# Patient Record
Sex: FEMALE | Race: BLACK OR AFRICAN AMERICAN
[De-identification: names, ages, dates, MRNs, and addresses within clinical notes are randomized per-mention and may not be internally consistent; named-entity substitution may affect disease eponyms.]

---

## 2022-01-26 ENCOUNTER — HOSPITAL ENCOUNTER (EMERGENCY)
Dept: HOSPITAL 61 - ER | Age: 37
LOS: 1 days | Discharge: HOME | End: 2022-01-27
Payer: MEDICAID

## 2022-01-26 VITALS — WEIGHT: 135.14 LBS | HEIGHT: 63 IN | BODY MASS INDEX: 23.95 KG/M2

## 2022-01-26 VITALS — DIASTOLIC BLOOD PRESSURE: 85 MMHG | SYSTOLIC BLOOD PRESSURE: 140 MMHG

## 2022-01-26 DIAGNOSIS — R10.13: Primary | ICD-10-CM

## 2022-01-26 DIAGNOSIS — F14.90: ICD-10-CM

## 2022-01-26 DIAGNOSIS — R11.2: ICD-10-CM

## 2022-01-26 PROCEDURE — 80307 DRUG TEST PRSMV CHEM ANLYZR: CPT

## 2022-01-26 PROCEDURE — 99284 EMERGENCY DEPT VISIT MOD MDM: CPT

## 2022-01-26 PROCEDURE — 81001 URINALYSIS AUTO W/SCOPE: CPT

## 2022-01-26 PROCEDURE — 87086 URINE CULTURE/COLONY COUNT: CPT

## 2022-01-26 PROCEDURE — 93005 ELECTROCARDIOGRAM TRACING: CPT

## 2022-01-26 PROCEDURE — 81025 URINE PREGNANCY TEST: CPT

## 2022-01-27 LAB
AMPHETAMINE/METHAMPHETAMINE: (no result)
APTT PPP: (no result) S
BACTERIA #/AREA URNS HPF: (no result) /HPF
BARBITURATES UR-MCNC: (no result) UG/ML
BENZODIAZ UR-MCNC: (no result) UG/L
BILIRUB UR QL STRIP: (no result)
CANNABINOIDS UR-MCNC: (no result) UG/L
COCAINE UR-MCNC: (no result) NG/ML
FIBRINOGEN PPP-MCNC: CLEAR MG/DL
METHADONE SERPL-MCNC: (no result) NG/ML
NITRITE UR QL STRIP: NEGATIVE
OPIATES UR-MCNC: (no result) NG/ML
PCP SERPL-MCNC: (no result) MG/DL
PH UR STRIP: 6 [PH]
PROT UR STRIP-MCNC: 30 MG/DL
RBC #/AREA URNS HPF: (no result) /HPF (ref 0–2)
UROBILINOGEN UR-MCNC: 1 MG/DL
WBC #/AREA URNS HPF: (no result) /HPF (ref 0–4)

## 2022-01-27 RX ADMIN — FAMOTIDINE ONE MG: 20 TABLET ORAL at 00:09

## 2022-01-27 RX ADMIN — ONDANSETRON ONE MG: 2 INJECTION INTRAMUSCULAR; INTRAVENOUS at 00:01

## 2022-01-27 RX ADMIN — FAMOTIDINE ONE MG: 10 INJECTION, SOLUTION INTRAVENOUS at 00:00

## 2022-01-27 RX ADMIN — ONDANSETRON ONE MG: 4 TABLET, ORALLY DISINTEGRATING ORAL at 00:09

## 2022-01-27 RX ADMIN — Medication ONE ML: at 00:09

## 2022-01-27 NOTE — PHYS DOC
Past Medical History


Past Surgical History:  Other


Additional Past Surgical Histo:  hernia repair


Smoking Status:  Never Smoker


Alcohol Use:  Occasionally





General Adult


EDM:


Chief Complaint:  ABDOMINAL PAIN





HPI:


HPI:





Patient is a 36  year old female who presents to the ED today complaining of 10 

out of 10 epigastric abdominal pain, symptoms have been going on intermittently 

for months.  Patient is also complaining of nausea and vomiting.  She reports 

vomiting twice in the last 24 hours.  Patient denies anything specifically 

exacerbating or relieving her pain.  She is very decisive in her HPI.  She 

states she is she has been evaluated before for this pain.  She states she was 

seen at [facility] a month ago had a full work-up and was given GI 

cocktail, Pepcid and Zofran.  She states she was told to follow-up with GI for 

an EGD.  She states she has an appointment with her PCP on February 4, 2022.  

She presents today requesting something for her pain.  She states there is 

nothing new about her pain.





Review of Systems:


Review of Systems:


Constitutional:   Denies fever or chills. []


Eyes:   Denies change in visual acuity. []


HENT:   Denies nasal congestion or sore throat. [] 


Respiratory:   Denies cough or shortness of breath. [] 


Cardiovascular:   Denies chest pain or edema. [] 


GI: Reports epigastric abdominal pain with nausea and vomiting, denies bloody 

stools or diarrhea. [] 


:  Denies dysuria. [] 


Musculoskeletal:   Denies back pain or joint pain. [] 


Integument:   Denies rash. [] 


Neurologic:   Denies headache, focal weakness or sensory changes. [] ] 


Psychiatric:  Denies depression or anxiety. []





Heart Score:


C/O Chest Pain:  N/A


Risk Factors:


Risk Factors:  DM, Current or recent (<one month) smoker, HTN, HLP, family 

history of CAD, obesity.


Risk Scores:


Score 0 - 3:  2.5% MACE over next 6 weeks - Discharge Home


Score 4 - 6:  20.3% MACE over next 6 weeks - Admit for Clinical Observation


Score 7 - 10:  72.7% MACE over next 6 weeks - Early Invasive Strategies





Current Medications:





Current Medications








 Medications


  (Trade)  Dose


 Ordered  Sig/Amadou  Start Time


 Stop Time Status Last Admin


Dose Admin


 


 Famotidine


  (Pepcid Vial)  20 mg  1X  ONCE  1/27/22 00:30


 1/27/22 00:03 DC  





 


 Famotidine


  (Pepcid)  20 mg  STK-MED ONCE  1/27/22 00:06


 1/27/22 00:06 DC  





 


 Multi-Ingredient


 Mouthwash/Gargle


  (Gi Cocktail)  20 ml  1X  ONCE  1/27/22 00:30


 1/27/22 00:31 DC 1/27/22 00:09


20 ML


 


 Ondansetron HCl


  (Zofran Odt)  4 mg  1X  ONCE  1/27/22 00:30


 1/27/22 00:31 DC 1/27/22 00:09


4 MG


 


 Ondansetron HCl


  (Zofran)  4 mg  1X  ONCE  1/27/22 00:30


 1/27/22 00:03 DC  














Allergies:


Allergies:





Allergies








Coded Allergies Type Severity Reaction Last Updated Verified


 


  No Known Drug Allergies    1/26/22 No











Physical Exam:


PE:





Constitutional: Well developed, well nourished, no acute distress, non-toxic 

appearance. []


HENT: Normocephalic, atraumatic, bilateral external ears normal, oropharynx 

moist, no oral exudates, nose normal. []


Eyes: PERRLA, EOMI, conjunctiva normal, no discharge. [] 


Neck: Normal range of motion, no tenderness, supple, no stridor. [] 


Cardiovascular:Heart rate regular rhythm, no murmur []


Lungs & Thorax:  Bilateral breath sounds clear to auscultation []


Abdomen: Bowel sounds normal, soft, tenderness to the epigastric abdomen, no 

right upper quadrant or right lower quadrant tenderness, no masses, no pulsatile

 masses. [] 


Skin: Warm, dry, no erythema, no rash. [] 


Back: No tenderness, no CVA tenderness. [] 


Extremities: No tenderness, no cyanosis, no clubbing, ROM intact, no edema. [] 


Neurologic: Alert and oriented X 3, normal motor function, normal sensory 

function, no focal deficits noted. []


Psychologic: Affect normal, judgement normal, mood normal. []





Current Patient Data:


Labs:





                                Laboratory Tests








Test


 1/27/22


00:15 1/27/22


00:16


 


Urine Collection Type Void   


 


Urine Color Lilly   


 


Urine Clarity Clear   


 


Urine pH


 6.0 (<5.0-8.0)


 





 


Urine Specific Gravity


 >=1.030


(1.000-1.030) 





 


Urine Protein


 30 mg/dL


(NEG-TRACE) 





 


Urine Glucose (UA)


 Negative mg/dL


(NEG) 





 


Urine Ketones (Stick)


 >=80 mg/dL


(NEG) 





 


Urine Blood


 Negative (NEG)


 





 


Urine Nitrite


 Negative (NEG)


 





 


Urine Bilirubin Small (NEG)   


 


Urine Urobilinogen Dipstick


 1.0 mg/dL (0.2


mg/dL) 





 


Urine Leukocyte Esterase


 Negative (NEG)


 





 


Urine RBC


 1-2 /HPF (0-2)


 





 


Urine WBC


 1-4 /HPF (0-4)


 





 


Urine Squamous Epithelial


Cells Many /LPF  


 





 


Urine Bacteria


 Moderate /HPF


(0-FEW) 





 


Urine Mucus Mod /LPF   


 


Urine Opiates Screen Neg (NEG)   


 


Urine Methadone Screen Neg (NEG)   


 


Urine Barbiturates Neg (NEG)   


 


Urine Phencyclidine Screen Neg (NEG)   


 


Urine


Amphetamine/Methamphetamine Neg (NEG)  


 





 


Urine Benzodiazepines Screen Neg (NEG)   


 


Urine Cocaine Screen Pos (NEG)   


 


Urine Cannabinoids Screen Neg (NEG)   


 


Urine Ethyl Alcohol Neg (NEG)   


 


POC Urine HCG, Qualitative


 


 Hcg negative


(Negative)








Vital Signs:





                                   Vital Signs








  Date Time  Temp Pulse Resp B/P (MAP) Pulse Ox O2 Delivery O2 Flow Rate FiO2


 


1/26/22 22:23  94 18 140/85 (103) 99 Room Air  


 


1/26/22 17:40 98.8       





 98.8       











EKG:


EKG:


[]





Radiology/Procedures:


Radiology/Procedures:


[]





Course & Med Decision Making:


Course & Med Decision Making


Pertinent Labs and Imaging studies reviewed. (See chart for details)





This is a 36-year-old female patient presenting to the ED today complaining of 

epigastric pain that has been going on for months.  Patient reports being seen 

at [facility] month ago and was worked out, she states the request 

that she follows up with a GI doctor for EGD if her pain persist.  She states 

she has not made an appointment with the GI doctor but has an appointment with 

her PCP on February 4, 2022.





She was given Zofran, GI cocktail and famotidine





UDS positive for cocaine use.  Negative for nitrites, negative for leukocytes.





I inquired from patient about this cocaine use.  She states she was drinking on 

Friday and used cocaine.  Informed patient I will send her home to follow-up 

with GI for her EGD as well as PCP.





Patient started begging for pain medicine.  She asked me with pain medicine I 

will send her home with.  Informed will give a famotidine, prednisone and 

dicyclomine.  Patient stated this will not work for her.  She states she came 

"all this way" for pain medicine.  Informed patient I do not have any indication

 of sending her home with anything stronger than what is mentioned.  Recommended

 Tylenol or Motrin.  She states those do not work for her.  Informed patient we 

will not send her home with any narcotics.  She started begging and pleading her

 case for pain medicine.  Informed patient she can follow-up with GI, pain 

clinic and PCP otherwise there is no indication for any narcotics from the ED.





Dragon Disclaimer:


Dragon Disclaimer:


This electronic medical record was generated, in whole or in part, using a voice

 recognition dictation system.





Departure


Departure


Impression:  


   Primary Impression:  


   Cocaine use


   Additional Impression:  


   Epigastric pain


Disposition:  01 HOME / SELF CARE / HOMELESS


Condition:  STABLE


Referrals:  


NO PCP (PCP)








ZHANNA DEL CID MD


follow up the next 1-2 weeks or when they have an opening





SIMIN MEADOWS MD


follow up on 2-4-2022 as scheduled





MATTEO BATRES MD


follow up in the next 1-2 weeks


Patient Instructions:  Abdominal Pain (Nonspecific), Cocaine Abuse and Chemical 

Dependency





Additional Instructions:  


You were evaluated in the emergency room for epigastric abdominal pain.  We 

provided you a pain clinic doctor and GI doctor, please follow-up with them as 

soon as possible.  Also follow-up with your primary care doctor on February 4, 2022


Scripts


Famotidine (FAMOTIDINE) 20 Mg Tablet


20 MG PO DAILY, #14 TAB


   Prov: TOBY MORGAN APRN         1/27/22 


Dicyclomine Hcl (DICYCLOMINE HCL) 20 Mg Tablet


1 TAB PO TID, #30 TAB 1 Refill


   Prov: TOBY MORGAN APRN         1/27/22 


Ondansetron (ONDANSETRON ODT) 4 Mg Tab.rapdis


1 TAB PO PRN Q6-8HRS, #16 TAB


   Prov: TOBY MORGAN APRN         1/27/22











TOBY MORGAN APRN            Jan 27, 2022 01:19

## 2022-01-27 NOTE — EKG
[facility]

              [address] [place] [address]

Test Date:    2022               Test Time:    17:49:59

Pat Name:     SHIRLEY NEW            Department:   

Patient ID:   [id number]           Room:          

Gender:       F                        Technician:   

:          [id number]               Requested By: TOBY MORGAN

Order Number: [id number]           Reading MD:   Dirk Fang

                                 Measurements

Intervals                              Axis          

Rate:         69                       P:            51

GA:           156                      QRS:          52

QRSD:         86                       T:            22

QT:           370                                    

QTc:          398                                    

                           Interpretive Statements

SINUS RHYTHM

LEFT ATRIAL ABNORMALITY

Electronically Signed On 2022 13:10:21 CST by Dirk Fang